# Patient Record
Sex: FEMALE | Race: OTHER | Employment: FULL TIME | URBAN - METROPOLITAN AREA
[De-identification: names, ages, dates, MRNs, and addresses within clinical notes are randomized per-mention and may not be internally consistent; named-entity substitution may affect disease eponyms.]

---

## 2023-01-17 ENCOUNTER — APPOINTMENT (OUTPATIENT)
Dept: GENERAL RADIOLOGY | Age: 53
End: 2023-01-17
Attending: PHYSICIAN ASSISTANT
Payer: COMMERCIAL

## 2023-01-17 ENCOUNTER — HOSPITAL ENCOUNTER (EMERGENCY)
Age: 53
Discharge: HOME | End: 2023-01-17
Attending: EMERGENCY MEDICINE
Payer: COMMERCIAL

## 2023-01-17 VITALS
TEMPERATURE: 98.6 F | BODY MASS INDEX: 29.77 KG/M2 | HEIGHT: 63 IN | HEART RATE: 74 BPM | RESPIRATION RATE: 16 BRPM | DIASTOLIC BLOOD PRESSURE: 70 MMHG | OXYGEN SATURATION: 99 % | WEIGHT: 168 LBS | SYSTOLIC BLOOD PRESSURE: 129 MMHG

## 2023-01-17 DIAGNOSIS — M54.50 ACUTE RIGHT-SIDED LOW BACK PAIN WITHOUT SCIATICA: Primary | ICD-10-CM

## 2023-01-17 PROCEDURE — 74011250637 HC RX REV CODE- 250/637: Performed by: PHYSICIAN ASSISTANT

## 2023-01-17 PROCEDURE — 72100 X-RAY EXAM L-S SPINE 2/3 VWS: CPT

## 2023-01-17 PROCEDURE — 99283 EMERGENCY DEPT VISIT LOW MDM: CPT

## 2023-01-17 RX ORDER — METHOCARBAMOL 500 MG/1
1000 TABLET, FILM COATED ORAL 4 TIMES DAILY
Qty: 40 TABLET | Refills: 0 | Status: SHIPPED | OUTPATIENT
Start: 2023-01-17 | End: 2023-01-22

## 2023-01-17 RX ORDER — HYDROCODONE BITARTRATE AND ACETAMINOPHEN 5; 325 MG/1; MG/1
1 TABLET ORAL
Status: COMPLETED | OUTPATIENT
Start: 2023-01-17 | End: 2023-01-17

## 2023-01-17 RX ORDER — HYDROCODONE BITARTRATE AND ACETAMINOPHEN 5; 325 MG/1; MG/1
1 TABLET ORAL
Qty: 10 TABLET | Refills: 0 | Status: SHIPPED | OUTPATIENT
Start: 2023-01-17 | End: 2023-01-20

## 2023-01-17 RX ORDER — METHYLPREDNISOLONE 4 MG/1
TABLET ORAL
Qty: 1 DOSE PACK | Refills: 0 | Status: SHIPPED | OUTPATIENT
Start: 2023-01-17

## 2023-01-17 RX ORDER — METHOCARBAMOL 500 MG/1
1000 TABLET, FILM COATED ORAL
Status: COMPLETED | OUTPATIENT
Start: 2023-01-17 | End: 2023-01-17

## 2023-01-17 RX ADMIN — HYDROCODONE BITARTRATE AND ACETAMINOPHEN 1 TABLET: 5; 325 TABLET ORAL at 15:03

## 2023-01-17 RX ADMIN — METHOCARBAMOL TABLETS 1000 MG: 500 TABLET, COATED ORAL at 15:03

## 2023-01-17 NOTE — ED PROVIDER NOTES
EMERGENCY DEPARTMENT HISTORY AND PHYSICAL EXAM        Date: 1/17/2023  Patient Name: Yamil Choi    History of Presenting Illness     Chief Complaint   Patient presents with    Back Pain       History Provided By: Patient and son    HPI: Yamil Choi, 45 y.o. female presents to the ED with cc of back pain. She speaks very little English, translation was provided by her son. She reports acute onset of right-sided lower back pain earlier today as she was lifting an object and placing it overhead. She describes sharp stabbing pain to the right lower back/buttock, nonradiating. Pain worsens with movements, she feels better when sitting absolutely still. She denies extremity weakness, saddle paresthesias, urinary or fecal incontinence. She has taken a dose of 800 mg of ibuprofen without resolution. PCP: Marshal Hobbs MD    No current facility-administered medications on file prior to encounter. No current outpatient medications on file prior to encounter. Past History     Past Medical History:  History reviewed. No pertinent past medical history. Past Surgical History:  History reviewed. No pertinent surgical history. Family History:  History reviewed. No pertinent family history. Social History:  Social History     Tobacco Use    Smoking status: Some Days     Types: Cigarettes    Smokeless tobacco: Never   Vaping Use    Vaping Use: Never used   Substance Use Topics    Drug use: Never       Allergies:   Allergies   Allergen Reactions    Naproxen Other (comments)         Review of Systems   Review of Systems  Respiratory: no cough, dyspnea, pleuritic pain  Cardio: no chest pain or palpitations  GI: no abdominal pain, nausea, vomiting  MSK: +back pain  Skin: no new rash or wounds  Neuro: no headache, dizziness, weakness, parasthesias        Physical Exam   Physical Exam  CONSTITUTIONAL:  Alert, in no apparent distress;  well developed;  well nourished, seated in WC  RESPIRATORY:  Chest clear, equal breath sounds, good air movement. CARDIOVASCULAR:  Regular rate and rhythm. No murmur  MSK: No midline tenderness to palpation along lumbar spine, no palpable step-off or bony abnormality. No significant tenderness to paralumbar musculature, there is some tenderness over the right SI joint into the gluteals. Lumbar spine range of motion is limited due to pain, in all planes. NEURO: 5/5 Hip flexion/extension, hip ab/adduction, knee flex/extension, foot dorsi/plantar flexion, great toe flex/extension intact and equal, patellar and Achilles tendon reflexes are intact and equal bilaterally  SKIN:  No rashes;  Normal for age. PSYCH:  Alert and normal affect. Diagnostic Study Results     Labs -   No results found for this or any previous visit (from the past 12 hour(s)). Radiologic Studies -   XR SPINE LUMB 2 OR 3 V   Final Result   1. No acute findings of lumbar spine   -Moderate degenerative disc disease at L4-5, without instability           CT Results  (Last 48 hours)      None          CXR Results  (Last 48 hours)      None            Medical Decision Making   I am the first provider for this patient. I reviewed the vital signs, available nursing notes, past medical history, past surgical history, family history and social history. Vital Signs-Reviewed the patient's vital signs. Patient Vitals for the past 12 hrs:   Temp Pulse Resp BP SpO2   01/17/23 1339 98.6 °F (37 °C) -- -- -- --   01/17/23 1336 -- 74 16 129/70 99 %         Provider Notes (Medical Decision Making):   Patient presenting with complaint of acute onset right lower back pain without radiculopathy. Differential diagnosis including muscle strain, disc herniation, spondylolisthesis, spondylolysis, cauda equina. Will obtain lumbar x-rays with flexion-extension films to assess for instability. We will treat symptomatically    ED Course:   Initial assessment performed.  The patients presenting problems have been discussed, and they are in agreement with the care plan formulated and outlined with them. I have encouraged them to ask questions as they arise throughout their visit. Patient remained stable throughout her stay, results of diagnostics reviewed with patient. Lumbar imaging showing L4-5 degenerative disc disease, but no evidence of spondylolysis, spondylolisthesis or instability. Based on her area of tenderness, this seems to be more muscular, and more so over the SI joint region and into the gluteals. While here in the ED she was given 1 tab of hydrocodone/acetaminophen 5/325 and 1000 mg of methocarbamol. She was feeling much better, and I was able to observe her walking throughout the department without gait abnormality or significant difficulty. We will send her home with a Medrol Dosepak as well as muscle relaxer and a few tablets of hydrocodone. Have encouraged her to follow-up with PCP or orthopedics, likely for referral to physical therapy. Disposition:  Discharged    PLAN:  1. Discharge Medication List as of 1/17/2023  4:05 PM        START taking these medications    Details   HYDROcodone-acetaminophen (Norco) 5-325 mg per tablet Take 1 Tablet by mouth every six (6) hours as needed for Pain for up to 3 days. Max Daily Amount: 4 Tablets., Normal, Disp-10 Tablet, R-0      methylPREDNISolone (Medrol, Jabier,) 4 mg tablet Take as directed, Normal, Disp-1 Dose Pack, R-0      methocarbamoL (ROBAXIN) 500 mg tablet Take 2 Tablets by mouth four (4) times daily for 5 days. , Normal, Disp-40 Tablet, R-0QID PRN muscle spasm           2. Follow-up Information       Follow up With Specialties Details Why [address]  [phone]          Return to ED if worse     Diagnosis     Clinical Impression:   1.  Acute right-sided low back pain without sciatica        Attestations:    Amandeep Quinones PA-C    Please note that this dictation was completed with [employer], the [employer] voice recognition software. Quite often unanticipated grammatical, syntax, homophones, and other interpretive errors are inadvertently transcribed by the computer software. Please disregard these errors. Please excuse any errors that have escaped final proofreading. Thank you.

## 2023-01-17 NOTE — DISCHARGE INSTRUCTIONS
Follow up with PCP or ortho if pain does not resolve, you may need referral to physical therapy. Take all medications as prescribed.

## 2023-01-17 NOTE — ED TRIAGE NOTES
Pt c/o lower lumbar pain after reaching to place something on top shelf today. Pt denies pain going down leg, denies loss of bowel or bladder.